# Patient Record
(demographics unavailable — no encounter records)

---

## 2024-10-24 NOTE — HISTORY OF PRESENT ILLNESS
[FreeTextEntry1] : Patient is a 50 year old male with a history of severe kyphoscoliosis, mechanical mitral valve replacement and MATT clip on 6/30/2020, atrial fibrillation, vitamin D deficiency, elevated TSH, and dyslipidemia who presents for follow up of rhythm disorder and management of anticoagulation. His hospital course was complicated by acute CHF, lactic acidosis, and episodes of paroxysmal atrial fibrillation and respiratory failure at the time of his mitral valve surgery. He has been feeling well, denying any chest pain, shortness of breath, palpitations, headaches or dizziness.  He is currently taking Coumadin 3 mg at bedtime.

## 2024-10-24 NOTE — DISCUSSION/SUMMARY
[EKG obtained to assist in diagnosis and management of assessed problem(s)] : EKG obtained to assist in diagnosis and management of assessed problem(s) [FreeTextEntry1] : IMPRESSION: Mr. Flores is a 50 year old male with a history of severe kyphoscoliosis, mechanical mitral valve replacement and MATT clip on 6/30/2020, atrial fibrillation, vitamin D deficiency, elevated TSH, and dyslipidemia who presents for a follow up of rhythm disorder and management of anticoagulation.   PLAN: 1. He is s/p mechanical mitral valve replacement, thus he will continue on Aspirin 81 mg daily and Coumadin. His INR was therapeutic today at 3.1. He will continue on Coumadin 3 mg at bedtime. He will have a repeat INR in 3-4 weeks. His goal INR is between 2.5-3.5 given his MVR. 2. His heart rate was acceptable on exam and on his ECG without any ectopy. He will continue on Metoprolol 50 mg in the evening and 25 mg in the morning. He will continue to stay well hydrated and has not had any caffeine/energy drinks. His blood pressure is good today.  3. He will continue on diet modification given his dyslipidemia. I have explained to him that if his LDL does not improve, that we will need to consider starting him on a statin. I will be checking a CMP and lipid profile today.   4. He understands the importance of antibiotic prophylaxis status post mechanical mitral valve replacement. 5. He will follow up with me in 4 months, or sooner if he is symptomatic.

## 2024-10-24 NOTE — PHYSICAL EXAM
[General Appearance - Well Developed] : well developed [Normal Appearance] : normal appearance [Well Groomed] : well groomed [General Appearance - Well Nourished] : well nourished [No Deformities] : no deformities [General Appearance - In No Acute Distress] : no acute distress [Normal Conjunctiva] : the conjunctiva exhibited no abnormalities [Normal Oral Mucosa] : normal oral mucosa [No Oral Pallor] : no oral pallor [No Oral Cyanosis] : no oral cyanosis [Normal Jugular Venous A Waves Present] : normal jugular venous A waves present [Normal Jugular Venous V Waves Present] : normal jugular venous V waves present [Respiration, Rhythm And Depth] : normal respiratory rhythm and effort [Exaggerated Use Of Accessory Muscles For Inspiration] : no accessory muscle use [Auscultation Breath Sounds / Voice Sounds] : lungs were clear to auscultation bilaterally [Normal Rate] : normal [Rhythm Regular] : regular [Normal S1] : normal S1 [Normal S2] : normal S2 [Prosthetic Mitral Valve] : prosthetic mitral valve heard [II] : a grade 2 [No Pitting Edema] : no pitting edema present [Bowel Sounds] : normal bowel sounds [Abdomen Soft] : soft [Abdomen Tenderness] : non-tender [Abnormal Walk] : normal gait [Nail Clubbing] : no clubbing of the fingernails [Cyanosis, Localized] : no localized cyanosis [Petechial Hemorrhages (___cm)] : no petechial hemorrhages [Skin Color & Pigmentation] : normal skin color and pigmentation [] : no rash [No Skin Ulcers] : no skin ulcer [Oriented To Time, Place, And Person] : oriented to person, place, and time [Affect] : the affect was normal [Mood] : the mood was normal [No Anxiety] : not feeling anxious [Right Carotid Bruit] : no bruit heard over the right carotid [Left Carotid Bruit] : no bruit heard over the left carotid [Bruit] : no bruit heard [FreeTextEntry1] : severe scoliosis

## 2024-10-24 NOTE — CARDIOLOGY SUMMARY
[___] : [unfilled] [LVEF ___%] : LVEF [unfilled]% [None] : no pulmonary hypertension [___] : [unfilled] [de-identified] : 10/24/2024: Sinus Rhythm at 93 beats per minute with an old septal infarct, and T wave abnormality, consider lateral ischemia.  [de-identified] : 7/11/2024: Technically difficult image quality. Endocardium not well-visualized; grossly low normal left ventricular ejection fraction. There is paradoxical septal motion, consistent with prior cardiac surgery. EF is approximately 55%. Concentric left ventricular remodeling. Normal right ventricular cavity size and normal right ventricular systolic function. The left atrium is severely dilated. Mechanical mitral valve replacement. Minimal mitral regurgitation. Peak transmitral valve gradient of 5.5 mmHg and mean transmitral valve gradient of 3.8 mmHg, which are likely normal in the presence of a mechanical mitral valve replacement. Calcified trileaflet aortic valve with decreased opening. The aortic valve area is approximately 1.6 cm, by the continuity equation, which is consistent with mild aortic stenosis. Trace aortic regurgitation. No echocardiographic evidence of pulmonary hypertension. Estimated pulmonary artery systolic pressure is 22 mmHg. Mild tricuspid regurgitation.   10/20/2022: Mechanical prosthetic mitral valve replacement. No significant mitral regurgitation.  Peak mitral valve gradient equals 4 mm Hg, mean transmitral valve gradient equals 2 mm Hg, which is probably normal in the setting of a mechanical prosthetic mitral valve replacement. Calcified trileaflet aortic valve with mildly decreased opening. Aortic valve area equals 1.8 sqcm (by continuity equation), consistent with mild aortic stenosis. Concentric left ventricular remodeling. Endocardium not well visualized; grossly preserved left ventricular ejection fraction. There is paradoxical septal motion consistent with prior cardiac surgery. Mild diastolic dysfunction. Normal right ventricular size and function. PASP= 22 mm Hg. No pulmonary HTN. Mild tricuspid regurgitation.

## 2024-10-24 NOTE — CARDIOLOGY SUMMARY
[___] : [unfilled] [LVEF ___%] : LVEF [unfilled]% [None] : no pulmonary hypertension [___] : [unfilled] [de-identified] : 10/24/2024: Sinus Rhythm at 93 beats per minute with an old septal infarct, and T wave abnormality, consider lateral ischemia.  [de-identified] : 7/11/2024: Technically difficult image quality. Endocardium not well-visualized; grossly low normal left ventricular ejection fraction. There is paradoxical septal motion, consistent with prior cardiac surgery. EF is approximately 55%. Concentric left ventricular remodeling. Normal right ventricular cavity size and normal right ventricular systolic function. The left atrium is severely dilated. Mechanical mitral valve replacement. Minimal mitral regurgitation. Peak transmitral valve gradient of 5.5 mmHg and mean transmitral valve gradient of 3.8 mmHg, which are likely normal in the presence of a mechanical mitral valve replacement. Calcified trileaflet aortic valve with decreased opening. The aortic valve area is approximately 1.6 cm, by the continuity equation, which is consistent with mild aortic stenosis. Trace aortic regurgitation. No echocardiographic evidence of pulmonary hypertension. Estimated pulmonary artery systolic pressure is 22 mmHg. Mild tricuspid regurgitation.   10/20/2022: Mechanical prosthetic mitral valve replacement. No significant mitral regurgitation.  Peak mitral valve gradient equals 4 mm Hg, mean transmitral valve gradient equals 2 mm Hg, which is probably normal in the setting of a mechanical prosthetic mitral valve replacement. Calcified trileaflet aortic valve with mildly decreased opening. Aortic valve area equals 1.8 sqcm (by continuity equation), consistent with mild aortic stenosis. Concentric left ventricular remodeling. Endocardium not well visualized; grossly preserved left ventricular ejection fraction. There is paradoxical septal motion consistent with prior cardiac surgery. Mild diastolic dysfunction. Normal right ventricular size and function. PASP= 22 mm Hg. No pulmonary HTN. Mild tricuspid regurgitation.

## 2025-02-27 NOTE — HISTORY OF PRESENT ILLNESS
[FreeTextEntry1] : Patient is a 50 year old male with a history of severe kyphoscoliosis, mechanical mitral valve replacement and MATT clip on 6/30/2020, atrial fibrillation, vitamin D deficiency, elevated TSH, and dyslipidemia who presents for follow up of rhythm disorder and management of anticoagulation. His hospital course was complicated by acute CHF, lactic acidosis, and episodes of paroxysmal atrial fibrillation and respiratory failure at the time of his mitral valve surgery. He has been feeling well, denying any chest pain, shortness of breath, palpitations, headaches or dizziness.  He is currently taking Coumadin 3 mg at bedtime.    he was admitted 2/9/2025 with abdominal pain and nausea and vomitting. Was discjharged on 2/11/2025  Feels wel now.   Increase Metoprolol to 50 mg twice daily

## 2025-02-27 NOTE — CARDIOLOGY SUMMARY
[___] : [unfilled] [LVEF ___%] : LVEF [unfilled]% [None] : no pulmonary hypertension [de-identified] : 10/24/2024: Sinus Rhythm at 93 beats per minute with an old septal infarct, and T wave abnormality, consider lateral ischemia.  [de-identified] : 7/11/2024: Technically difficult image quality. Endocardium not well-visualized; grossly low normal left ventricular ejection fraction. There is paradoxical septal motion, consistent with prior cardiac surgery. EF is approximately 55%. Concentric left ventricular remodeling. Normal right ventricular cavity size and normal right ventricular systolic function. The left atrium is severely dilated. Mechanical mitral valve replacement. Minimal mitral regurgitation. Peak transmitral valve gradient of 5.5 mmHg and mean transmitral valve gradient of 3.8 mmHg, which are likely normal in the presence of a mechanical mitral valve replacement. Calcified trileaflet aortic valve with decreased opening. The aortic valve area is approximately 1.6 cm, by the continuity equation, which is consistent with mild aortic stenosis. Trace aortic regurgitation. No echocardiographic evidence of pulmonary hypertension. Estimated pulmonary artery systolic pressure is 22 mmHg. Mild tricuspid regurgitation.   10/20/2022: Mechanical prosthetic mitral valve replacement. No significant mitral regurgitation.  Peak mitral valve gradient equals 4 mm Hg, mean transmitral valve gradient equals 2 mm Hg, which is probably normal in the setting of a mechanical prosthetic mitral valve replacement. Calcified trileaflet aortic valve with mildly decreased opening. Aortic valve area equals 1.8 sqcm (by continuity equation), consistent with mild aortic stenosis. Concentric left ventricular remodeling. Endocardium not well visualized; grossly preserved left ventricular ejection fraction. There is paradoxical septal motion consistent with prior cardiac surgery. Mild diastolic dysfunction. Normal right ventricular size and function. PASP= 22 mm Hg. No pulmonary HTN. Mild tricuspid regurgitation.

## 2025-02-27 NOTE — CARDIOLOGY SUMMARY
[___] : [unfilled] [LVEF ___%] : LVEF [unfilled]% [None] : no pulmonary hypertension [de-identified] : 10/24/2024: Sinus Rhythm at 93 beats per minute with an old septal infarct, and T wave abnormality, consider lateral ischemia.  [de-identified] : 7/11/2024: Technically difficult image quality. Endocardium not well-visualized; grossly low normal left ventricular ejection fraction. There is paradoxical septal motion, consistent with prior cardiac surgery. EF is approximately 55%. Concentric left ventricular remodeling. Normal right ventricular cavity size and normal right ventricular systolic function. The left atrium is severely dilated. Mechanical mitral valve replacement. Minimal mitral regurgitation. Peak transmitral valve gradient of 5.5 mmHg and mean transmitral valve gradient of 3.8 mmHg, which are likely normal in the presence of a mechanical mitral valve replacement. Calcified trileaflet aortic valve with decreased opening. The aortic valve area is approximately 1.6 cm, by the continuity equation, which is consistent with mild aortic stenosis. Trace aortic regurgitation. No echocardiographic evidence of pulmonary hypertension. Estimated pulmonary artery systolic pressure is 22 mmHg. Mild tricuspid regurgitation.   10/20/2022: Mechanical prosthetic mitral valve replacement. No significant mitral regurgitation.  Peak mitral valve gradient equals 4 mm Hg, mean transmitral valve gradient equals 2 mm Hg, which is probably normal in the setting of a mechanical prosthetic mitral valve replacement. Calcified trileaflet aortic valve with mildly decreased opening. Aortic valve area equals 1.8 sqcm (by continuity equation), consistent with mild aortic stenosis. Concentric left ventricular remodeling. Endocardium not well visualized; grossly preserved left ventricular ejection fraction. There is paradoxical septal motion consistent with prior cardiac surgery. Mild diastolic dysfunction. Normal right ventricular size and function. PASP= 22 mm Hg. No pulmonary HTN. Mild tricuspid regurgitation.

## 2025-02-27 NOTE — DISCUSSION/SUMMARY
[FreeTextEntry1] : IMPRESSION: Mr. Flores is a 50 year old male with a history of severe kyphoscoliosis, mechanical mitral valve replacement and MATT clip on 6/30/2020, atrial fibrillation, vitamin D deficiency, elevated TSH, and dyslipidemia who presents for a follow up of rhythm disorder and management of anticoagulation.   PLAN: 1. He is s/p mechanical mitral valve replacement, thus he will continue on Aspirin 81 mg daily and Coumadin. His INR was therapeutic today at 3.1. He will continue on Coumadin 3 mg at bedtime. He will have a repeat INR in 3-4 weeks. His goal INR is between 2.5-3.5 given his MVR. 2. His heart rate was acceptable on exam and on his ECG without any ectopy. He will continue on Metoprolol 50 mg in the evening and 25 mg in the morning. He will continue to stay well hydrated and has not had any caffeine/energy drinks. His blood pressure is good today.  3. He will continue on diet modification given his dyslipidemia. I have explained to him that if his LDL does not improve, that we will need to consider starting him on a statin. I will be checking a CMP and lipid profile today.   4. He understands the importance of antibiotic prophylaxis status post mechanical mitral valve replacement. 5. He will follow up with me in 4 months, or sooner if he is symptomatic.

## 2025-03-13 NOTE — HISTORY OF PRESENT ILLNESS
[FreeTextEntry1] : Patient is a 50 year old male with a history of severe kyphoscoliosis, mechanical mitral valve replacement and MATT clip on 6/30/2020, atrial fibrillation, vitamin D deficiency, elevated TSH, and dyslipidemia who presents for follow up of rhythm disorder and management of anticoagulation. His hospital course was complicated by acute CHF, lactic acidosis, and episodes of paroxysmal atrial fibrillation and respiratory failure at the time of his mitral valve surgery. He has been feeling well, denying any chest pain, shortness of breath, palpitations, headaches or dizziness. He was hospitalized on 2/9/2025 with abdominal pain, nausea, and vomiting. He had a CT scan done at that time that revealed mild sigmoid colon wall thickening suggestive of early diverticulitis, as well as a small hiatal hernia without obstruction or ischemia. He was treated for sepsis secondary to diverticulitis and discharged 2/11/2025. Today, he states "I feel good". He denies any chest pain, shortness of breath, palpitations, headaches or dizziness. Confirms taking all prescribed meds Metoprolol 50 mg twice daily, Aspirin 81 mg daily and Coumadin.

## 2025-03-13 NOTE — CARDIOLOGY SUMMARY
[___] : [unfilled] [LVEF ___%] : LVEF [unfilled]% [None] : no pulmonary hypertension [de-identified] : 3/13/2025: Sinus tachycardia at a 120 bpm with an old septal infarct and nonspecific T wave abnormality.ischemia.  [de-identified] : 7/11/2024: Technically difficult image quality. Endocardium not well-visualized; grossly low normal left ventricular ejection fraction. There is paradoxical septal motion, consistent with prior cardiac surgery. EF is approximately 55%. Concentric left ventricular remodeling. Normal right ventricular cavity size and normal right ventricular systolic function. The left atrium is severely dilated. Mechanical mitral valve replacement. Minimal mitral regurgitation. Peak transmitral valve gradient of 5.5 mmHg and mean transmitral valve gradient of 3.8 mmHg, which are likely normal in the presence of a mechanical mitral valve replacement. Calcified trileaflet aortic valve with decreased opening. The aortic valve area is approximately 1.6 cm, by the continuity equation, which is consistent with mild aortic stenosis. Trace aortic regurgitation. No echocardiographic evidence of pulmonary hypertension. Estimated pulmonary artery systolic pressure is 22 mmHg. Mild tricuspid regurgitation.   10/20/2022: Mechanical prosthetic mitral valve replacement. No significant mitral regurgitation.  Peak mitral valve gradient equals 4 mm Hg, mean transmitral valve gradient equals 2 mm Hg, which is probably normal in the setting of a mechanical prosthetic mitral valve replacement. Calcified trileaflet aortic valve with mildly decreased opening. Aortic valve area equals 1.8 sqcm (by continuity equation), consistent with mild aortic stenosis. Concentric left ventricular remodeling. Endocardium not well visualized; grossly preserved left ventricular ejection fraction. There is paradoxical septal motion consistent with prior cardiac surgery. Mild diastolic dysfunction. Normal right ventricular size and function. PASP= 22 mm Hg. No pulmonary HTN. Mild tricuspid regurgitation.

## 2025-03-13 NOTE — DISCUSSION/SUMMARY
[FreeTextEntry1] : IMPRESSION: Mr. Flores is a 50 year old male with a history of severe kyphoscoliosis, mechanical mitral valve replacement and MATT clip on 6/30/2020, atrial fibrillation, vitamin D deficiency, elevated TSH, and dyslipidemia who presents for a follow up of rhythm disorder and management of anticoagulation.   PLAN: 1. He is s/p mechanical mitral valve replacement, thus he will continue on Aspirin 81 mg daily and Coumadin. His INR was therapeutic today at 3. He will continue on Coumadin 3 mg at bedtime 6 days a week and 1.5 mg on Thursdays. He will have a repeat INR in 3 weeks. His goal INR is between 2.5-3.5 given his MVR. 2. His heart rate remains elevated, however, he is asymptomatic.  He did not have any urinary abnormalities on his most recent urinalysis.  His TSH was normal.  He was not anemic.  I have increased his metoprolol to 75 mg in the morning and 50 mg in the evening.  We discussed the importance of staying well-hydrated. He was in sinus tachycardia on his ECG that was performed today. His blood pressure is fine today.  3. He will continue on diet modification given his dyslipidemia. I have explained to him that I would prefer starting him on a statin, however, we will wait until after his heart rate has improved.  In the meantime, he will modify his diet.   4. He understands the importance of antibiotic prophylaxis status post mechanical mitral valve replacement. 5. He will follow up with me in 3 weeks for follow up of his heart rate, or sooner if he is symptomatic.  [EKG obtained to assist in diagnosis and management of assessed problem(s)] : EKG obtained to assist in diagnosis and management of assessed problem(s)

## 2025-04-03 NOTE — DISCUSSION/SUMMARY
[FreeTextEntry1] : IMPRESSION: Mr. Flores is a 50 year old male with a history of severe kyphoscoliosis, mechanical mitral valve replacement and MATT clip on 6/30/2020, atrial fibrillation, vitamin D deficiency, elevated TSH, and dyslipidemia who presents for a follow up of rhythm disorder and management of anticoagulation.   PLAN: 1. He is s/p mechanical mitral valve replacement, thus he will continue on Aspirin 81 mg daily and Coumadin. His INR was mildly supratherapeutic today at 3.6. He will change his Coumadin regimen to 1.5 mg on Thursdays and Mondays and 3 mg the other 5 days of the week.  He will have a repeat INR in 2 weeks. His goal INR is between 2.5-3.5 given his MVR. 2. His heart rate is much improved.  He states that he has also been much better when he has checked it at home.  Given that his heart rate was in the low 60s on exam, I have decreased his metoprolol back to 50 mg twice daily.  He will continue to follow his heart rate at home. He understands the importance of staying well-hydrated. He was in sinus rhythm on his ECG that was performed today. His blood pressure is fine today.  3. He will continue on diet modification given his dyslipidemia.    4. He understands the importance of antibiotic prophylaxis status post mechanical mitral valve replacement. 5. He will follow up with me in 6 weeks for follow up of his heart rate, or sooner if he is symptomatic.  [EKG obtained to assist in diagnosis and management of assessed problem(s)] : EKG obtained to assist in diagnosis and management of assessed problem(s)

## 2025-04-03 NOTE — CARDIOLOGY SUMMARY
[___] : [unfilled] [LVEF ___%] : LVEF [unfilled]% [None] : no pulmonary hypertension [de-identified] : 4/3/2025: Sinus Rhythm at 74 beats per minute with nonspecific T wave abnormality. [de-identified] : 7/11/2024: Technically difficult image quality. Endocardium not well-visualized; grossly low normal left ventricular ejection fraction. There is paradoxical septal motion, consistent with prior cardiac surgery. EF is approximately 55%. Concentric left ventricular remodeling. Normal right ventricular cavity size and normal right ventricular systolic function. The left atrium is severely dilated. Mechanical mitral valve replacement. Minimal mitral regurgitation. Peak transmitral valve gradient of 5.5 mmHg and mean transmitral valve gradient of 3.8 mmHg, which are likely normal in the presence of a mechanical mitral valve replacement. Calcified trileaflet aortic valve with decreased opening. The aortic valve area is approximately 1.6 cm, by the continuity equation, which is consistent with mild aortic stenosis. Trace aortic regurgitation. No echocardiographic evidence of pulmonary hypertension. Estimated pulmonary artery systolic pressure is 22 mmHg. Mild tricuspid regurgitation.   10/20/2022: Mechanical prosthetic mitral valve replacement. No significant mitral regurgitation.  Peak mitral valve gradient equals 4 mm Hg, mean transmitral valve gradient equals 2 mm Hg, which is probably normal in the setting of a mechanical prosthetic mitral valve replacement. Calcified trileaflet aortic valve with mildly decreased opening. Aortic valve area equals 1.8 sqcm (by continuity equation), consistent with mild aortic stenosis. Concentric left ventricular remodeling. Endocardium not well visualized; grossly preserved left ventricular ejection fraction. There is paradoxical septal motion consistent with prior cardiac surgery. Mild diastolic dysfunction. Normal right ventricular size and function. PASP= 22 mm Hg. No pulmonary HTN. Mild tricuspid regurgitation.

## 2025-04-03 NOTE — PHYSICAL EXAM
[General Appearance - Well Developed] : well developed [Normal Appearance] : normal appearance [Well Groomed] : well groomed [General Appearance - Well Nourished] : well nourished [No Deformities] : no deformities [General Appearance - In No Acute Distress] : no acute distress [Normal Conjunctiva] : the conjunctiva exhibited no abnormalities [Normal Oral Mucosa] : normal oral mucosa [No Oral Pallor] : no oral pallor [No Oral Cyanosis] : no oral cyanosis [Normal Jugular Venous A Waves Present] : normal jugular venous A waves present [Normal Jugular Venous V Waves Present] : normal jugular venous V waves present [Respiration, Rhythm And Depth] : normal respiratory rhythm and effort [Exaggerated Use Of Accessory Muscles For Inspiration] : no accessory muscle use [Auscultation Breath Sounds / Voice Sounds] : lungs were clear to auscultation bilaterally [Normal Rate] : normal [Rhythm Regular] : regular [Normal S1] : normal S1 [Normal S2] : normal S2 [Prosthetic Mitral Valve] : prosthetic mitral valve heard [II] : a grade 2 [No Pitting Edema] : no pitting edema present [Bowel Sounds] : normal bowel sounds [Abdomen Soft] : soft [Abdomen Tenderness] : non-tender [Abnormal Walk] : normal gait [Nail Clubbing] : no clubbing of the fingernails [Cyanosis, Localized] : no localized cyanosis [Petechial Hemorrhages (___cm)] : no petechial hemorrhages [Skin Color & Pigmentation] : normal skin color and pigmentation [No Skin Ulcers] : no skin ulcer [] : no rash [Oriented To Time, Place, And Person] : oriented to person, place, and time [Affect] : the affect was normal [Mood] : the mood was normal [No Anxiety] : not feeling anxious [Right Carotid Bruit] : no bruit heard over the right carotid [Left Carotid Bruit] : no bruit heard over the left carotid [Bruit] : no bruit heard [FreeTextEntry1] : severe scoliosis

## 2025-04-03 NOTE — HISTORY OF PRESENT ILLNESS
[FreeTextEntry1] : Patient is a 50 year old male with a history of severe kyphoscoliosis, mechanical mitral valve replacement and MATT clip on 6/30/2020, atrial fibrillation, vitamin D deficiency, elevated TSH, and dyslipidemia who presents for follow up of rhythm disorder and management of anticoagulation. His hospital course was complicated by acute CHF, lactic acidosis, and episodes of paroxysmal atrial fibrillation and respiratory failure at the time of his mitral valve surgery. He has been feeling well, denying any chest pain, shortness of breath, palpitations, headaches or dizziness. He was hospitalized on 2/9/2025 with abdominal pain, nausea, and vomiting. He had a CT scan done at that time that revealed mild sigmoid colon wall thickening suggestive of early diverticulitis, as well as a small hiatal hernia without obstruction or ischemia. He was treated for sepsis secondary to diverticulitis and discharged 2/11/2025. Today, he states that he feels well. He denies any chest pain, shortness of breath, palpitations, headaches or dizziness. Confirms taking all prescribed meds Metoprolol 125 mg daily, Aspirin 81 mg daily and Coumadin.

## 2025-05-15 NOTE — HISTORY OF PRESENT ILLNESS
[FreeTextEntry1] : Patient is a 50 year old male with a history of severe kyphoscoliosis, mechanical mitral valve replacement and MATT clip on 6/30/2020, atrial fibrillation, vitamin D deficiency, elevated TSH, and dyslipidemia who presents for follow up of rhythm disorder and management of anticoagulation. His hospital course was complicated by acute CHF, lactic acidosis, and episodes of paroxysmal atrial fibrillation and respiratory failure at the time of his mitral valve surgery. He has been feeling well, denying any chest pain, shortness of breath, palpitations, headaches or dizziness. He was hospitalized on 2/9/2025 with abdominal pain, nausea, and vomiting. He had a CT scan done at that time that revealed mild sigmoid colon wall thickening suggestive of early diverticulitis, as well as a small hiatal hernia without obstruction or ischemia. He was treated for sepsis secondary to diverticulitis and discharged 2/11/2025. Today, he states that he feels well. He denies any chest pain, shortness of breath, palpitations, headaches or dizziness.

## 2025-05-15 NOTE — DISCUSSION/SUMMARY
[FreeTextEntry1] : IMPRESSION: Mr. Flores is a 50 year old male with a history of severe kyphoscoliosis, mechanical mitral valve replacement and MATT clip on 6/30/2020, atrial fibrillation, vitamin D deficiency, elevated TSH, and dyslipidemia who presents for a follow up of rhythm disorder and management of anticoagulation.   PLAN: 1. He is s/p mechanical mitral valve replacement, thus he will continue on Aspirin 81 mg daily and Coumadin. His INR was mildly supratherapeutic today at 3.8. He will change his Coumadin regimen to 1.5 mg on Thursdays and 3 mg the other 6 days of the week.  He will have a repeat INR in 2 weeks. His goal INR is between 2.5-3.5 given his MVR. 2. His heart rate has remained improved.  He will continue on metoprolol 50 mg twice daily.  He will continue to follow his heart rate at home. He understands the importance of staying well-hydrated. He was in sinus rhythm on his ECG that was performed today. His blood pressure is fine today.  He had a PVC on his ECG, however, no ectopy on exam.   3. He will continue on diet modification given his dyslipidemia. He deferred blood work to the next visit.    4. He understands the importance of antibiotic prophylaxis status post mechanical mitral valve replacement. 5. He will follow up with me in 3 months for follow up of his heart rate, or sooner if he is symptomatic. He will schedule an echocardiogram at the time of the next visit.  [EKG obtained to assist in diagnosis and management of assessed problem(s)] : EKG obtained to assist in diagnosis and management of assessed problem(s)

## 2025-05-15 NOTE — CARDIOLOGY SUMMARY
[___] : [unfilled] [LVEF ___%] : LVEF [unfilled]% [None] : no pulmonary hypertension [de-identified] : 5/15/2025: Sinus Rhythm at 80 beats per minute with a PVC and nonspecific T wave abnormality. [de-identified] : 7/11/2024: Technically difficult image quality. Endocardium not well-visualized; grossly low normal left ventricular ejection fraction. There is paradoxical septal motion, consistent with prior cardiac surgery. EF is approximately 55%. Concentric left ventricular remodeling. Normal right ventricular cavity size and normal right ventricular systolic function. The left atrium is severely dilated. Mechanical mitral valve replacement. Minimal mitral regurgitation. Peak transmitral valve gradient of 5.5 mmHg and mean transmitral valve gradient of 3.8 mmHg, which are likely normal in the presence of a mechanical mitral valve replacement. Calcified trileaflet aortic valve with decreased opening. The aortic valve area is approximately 1.6 cm, by the continuity equation, which is consistent with mild aortic stenosis. Trace aortic regurgitation. No echocardiographic evidence of pulmonary hypertension. Estimated pulmonary artery systolic pressure is 22 mmHg. Mild tricuspid regurgitation.   10/20/2022: Mechanical prosthetic mitral valve replacement. No significant mitral regurgitation.  Peak mitral valve gradient equals 4 mm Hg, mean transmitral valve gradient equals 2 mm Hg, which is probably normal in the setting of a mechanical prosthetic mitral valve replacement. Calcified trileaflet aortic valve with mildly decreased opening. Aortic valve area equals 1.8 sqcm (by continuity equation), consistent with mild aortic stenosis. Concentric left ventricular remodeling. Endocardium not well visualized; grossly preserved left ventricular ejection fraction. There is paradoxical septal motion consistent with prior cardiac surgery. Mild diastolic dysfunction. Normal right ventricular size and function. PASP= 22 mm Hg. No pulmonary HTN. Mild tricuspid regurgitation.

## 2025-05-15 NOTE — CARDIOLOGY SUMMARY
[___] : [unfilled] [LVEF ___%] : LVEF [unfilled]% [None] : no pulmonary hypertension [de-identified] : 5/15/2025: Sinus Rhythm at 80 beats per minute with a PVC and nonspecific T wave abnormality. [de-identified] : 7/11/2024: Technically difficult image quality. Endocardium not well-visualized; grossly low normal left ventricular ejection fraction. There is paradoxical septal motion, consistent with prior cardiac surgery. EF is approximately 55%. Concentric left ventricular remodeling. Normal right ventricular cavity size and normal right ventricular systolic function. The left atrium is severely dilated. Mechanical mitral valve replacement. Minimal mitral regurgitation. Peak transmitral valve gradient of 5.5 mmHg and mean transmitral valve gradient of 3.8 mmHg, which are likely normal in the presence of a mechanical mitral valve replacement. Calcified trileaflet aortic valve with decreased opening. The aortic valve area is approximately 1.6 cm, by the continuity equation, which is consistent with mild aortic stenosis. Trace aortic regurgitation. No echocardiographic evidence of pulmonary hypertension. Estimated pulmonary artery systolic pressure is 22 mmHg. Mild tricuspid regurgitation.   10/20/2022: Mechanical prosthetic mitral valve replacement. No significant mitral regurgitation.  Peak mitral valve gradient equals 4 mm Hg, mean transmitral valve gradient equals 2 mm Hg, which is probably normal in the setting of a mechanical prosthetic mitral valve replacement. Calcified trileaflet aortic valve with mildly decreased opening. Aortic valve area equals 1.8 sqcm (by continuity equation), consistent with mild aortic stenosis. Concentric left ventricular remodeling. Endocardium not well visualized; grossly preserved left ventricular ejection fraction. There is paradoxical septal motion consistent with prior cardiac surgery. Mild diastolic dysfunction. Normal right ventricular size and function. PASP= 22 mm Hg. No pulmonary HTN. Mild tricuspid regurgitation.